# Patient Record
Sex: FEMALE | Race: WHITE | Employment: UNEMPLOYED | URBAN - METROPOLITAN AREA
[De-identification: names, ages, dates, MRNs, and addresses within clinical notes are randomized per-mention and may not be internally consistent; named-entity substitution may affect disease eponyms.]

---

## 2020-08-30 ENCOUNTER — HOSPITAL ENCOUNTER (EMERGENCY)
Age: 60
Discharge: ANOTHER HOSPITAL | End: 2020-08-31
Attending: EMERGENCY MEDICINE
Payer: COMMERCIAL

## 2020-08-30 ENCOUNTER — APPOINTMENT (OUTPATIENT)
Dept: GENERAL RADIOLOGY | Age: 60
End: 2020-08-30
Payer: COMMERCIAL

## 2020-08-30 PROCEDURE — 71045 X-RAY EXAM CHEST 1 VIEW: CPT

## 2020-08-30 PROCEDURE — 99291 CRITICAL CARE FIRST HOUR: CPT

## 2020-08-30 PROCEDURE — 93005 ELECTROCARDIOGRAM TRACING: CPT | Performed by: EMERGENCY MEDICINE

## 2020-08-30 PROCEDURE — 87040 BLOOD CULTURE FOR BACTERIA: CPT

## 2020-08-30 PROCEDURE — 2580000003 HC RX 258: Performed by: EMERGENCY MEDICINE

## 2020-08-30 PROCEDURE — 36415 COLL VENOUS BLD VENIPUNCTURE: CPT

## 2020-08-30 RX ORDER — 0.9 % SODIUM CHLORIDE 0.9 %
30 INTRAVENOUS SOLUTION INTRAVENOUS ONCE
Status: COMPLETED | OUTPATIENT
Start: 2020-08-30 | End: 2020-08-31

## 2020-08-30 RX ORDER — ACETAMINOPHEN 325 MG/1
650 TABLET ORAL ONCE
Status: COMPLETED | OUTPATIENT
Start: 2020-08-30 | End: 2020-08-31

## 2020-08-30 RX ADMIN — SODIUM CHLORIDE 1503 ML: 9 INJECTION, SOLUTION INTRAVENOUS at 23:56

## 2020-08-30 ASSESSMENT — PAIN DESCRIPTION - DIRECTION: RADIATING_TOWARDS: NON RADIATING

## 2020-08-30 ASSESSMENT — PAIN DESCRIPTION - ONSET: ONSET: PROGRESSIVE

## 2020-08-30 ASSESSMENT — PAIN - FUNCTIONAL ASSESSMENT: PAIN_FUNCTIONAL_ASSESSMENT: PREVENTS OR INTERFERES SOME ACTIVE ACTIVITIES AND ADLS

## 2020-08-30 ASSESSMENT — PAIN DESCRIPTION - LOCATION: LOCATION: HEAD

## 2020-08-30 ASSESSMENT — PAIN DESCRIPTION - PROGRESSION: CLINICAL_PROGRESSION: GRADUALLY IMPROVING

## 2020-08-30 ASSESSMENT — PAIN DESCRIPTION - DESCRIPTORS: DESCRIPTORS: ACHING

## 2020-08-30 ASSESSMENT — PAIN DESCRIPTION - ORIENTATION: ORIENTATION: RIGHT;LEFT

## 2020-08-30 ASSESSMENT — PAIN DESCRIPTION - FREQUENCY: FREQUENCY: CONTINUOUS

## 2020-08-30 ASSESSMENT — PAIN SCALES - GENERAL: PAINLEVEL_OUTOF10: 6

## 2020-08-30 ASSESSMENT — PAIN DESCRIPTION - PAIN TYPE: TYPE: ACUTE PAIN

## 2020-08-31 VITALS
BODY MASS INDEX: 30.64 KG/M2 | RESPIRATION RATE: 15 BRPM | HEIGHT: 62 IN | SYSTOLIC BLOOD PRESSURE: 132 MMHG | OXYGEN SATURATION: 96 % | DIASTOLIC BLOOD PRESSURE: 58 MMHG | WEIGHT: 166.5 LBS | TEMPERATURE: 98.5 F | HEART RATE: 86 BPM

## 2020-08-31 LAB
A/G RATIO: 1.2 (ref 1.1–2.2)
ALBUMIN SERPL-MCNC: 3.8 G/DL (ref 3.4–5)
ALP BLD-CCNC: 58 U/L (ref 40–129)
ALT SERPL-CCNC: 8 U/L (ref 10–40)
ANION GAP SERPL CALCULATED.3IONS-SCNC: 11 MMOL/L (ref 3–16)
AST SERPL-CCNC: 13 U/L (ref 15–37)
BASOPHILS ABSOLUTE: 0 K/UL (ref 0–0.2)
BASOPHILS RELATIVE PERCENT: 0.3 %
BILIRUB SERPL-MCNC: 0.9 MG/DL (ref 0–1)
BUN BLDV-MCNC: 13 MG/DL (ref 7–20)
CALCIUM SERPL-MCNC: 9.6 MG/DL (ref 8.3–10.6)
CHLORIDE BLD-SCNC: 103 MMOL/L (ref 99–110)
CO2: 21 MMOL/L (ref 21–32)
CREAT SERPL-MCNC: 1.3 MG/DL (ref 0.6–1.2)
EKG ATRIAL RATE: 128 BPM
EKG DIAGNOSIS: NORMAL
EKG P AXIS: 67 DEGREES
EKG P-R INTERVAL: 144 MS
EKG Q-T INTERVAL: 308 MS
EKG QRS DURATION: 78 MS
EKG QTC CALCULATION (BAZETT): 449 MS
EKG R AXIS: -14 DEGREES
EKG T AXIS: 105 DEGREES
EKG VENTRICULAR RATE: 128 BPM
EOSINOPHILS ABSOLUTE: 0 K/UL (ref 0–0.6)
EOSINOPHILS RELATIVE PERCENT: 0.3 %
GFR AFRICAN AMERICAN: 50
GFR NON-AFRICAN AMERICAN: 42
GLOBULIN: 3.2 G/DL
GLUCOSE BLD-MCNC: 170 MG/DL (ref 70–99)
HCT VFR BLD CALC: 39.8 % (ref 36–48)
HEMOGLOBIN: 13.2 G/DL (ref 12–16)
LACTIC ACID, SEPSIS: 1.1 MMOL/L (ref 0.4–1.9)
LYMPHOCYTES ABSOLUTE: 1 K/UL (ref 1–5.1)
LYMPHOCYTES RELATIVE PERCENT: 8.3 %
MCH RBC QN AUTO: 28.7 PG (ref 26–34)
MCHC RBC AUTO-ENTMCNC: 33.2 G/DL (ref 31–36)
MCV RBC AUTO: 86.6 FL (ref 80–100)
MONOCYTES ABSOLUTE: 0.8 K/UL (ref 0–1.3)
MONOCYTES RELATIVE PERCENT: 6.2 %
NEUTROPHILS ABSOLUTE: 10.8 K/UL (ref 1.7–7.7)
NEUTROPHILS RELATIVE PERCENT: 84.9 %
PDW BLD-RTO: 14.4 % (ref 12.4–15.4)
PLATELET # BLD: 167 K/UL (ref 135–450)
PMV BLD AUTO: 7.3 FL (ref 5–10.5)
POTASSIUM REFLEX MAGNESIUM: 3.6 MMOL/L (ref 3.5–5.1)
PRO-BNP: 892 PG/ML (ref 0–124)
RBC # BLD: 4.59 M/UL (ref 4–5.2)
SODIUM BLD-SCNC: 135 MMOL/L (ref 136–145)
TOTAL PROTEIN: 7 G/DL (ref 6.4–8.2)
TROPONIN: <0.01 NG/ML
WBC # BLD: 12.6 K/UL (ref 4–11)

## 2020-08-31 PROCEDURE — 80053 COMPREHEN METABOLIC PANEL: CPT

## 2020-08-31 PROCEDURE — 83880 ASSAY OF NATRIURETIC PEPTIDE: CPT

## 2020-08-31 PROCEDURE — 84484 ASSAY OF TROPONIN QUANT: CPT

## 2020-08-31 PROCEDURE — 2580000003 HC RX 258: Performed by: EMERGENCY MEDICINE

## 2020-08-31 PROCEDURE — 83605 ASSAY OF LACTIC ACID: CPT

## 2020-08-31 PROCEDURE — 85025 COMPLETE CBC W/AUTO DIFF WBC: CPT

## 2020-08-31 PROCEDURE — 96375 TX/PRO/DX INJ NEW DRUG ADDON: CPT

## 2020-08-31 PROCEDURE — 93010 ELECTROCARDIOGRAM REPORT: CPT | Performed by: INTERNAL MEDICINE

## 2020-08-31 PROCEDURE — U0003 INFECTIOUS AGENT DETECTION BY NUCLEIC ACID (DNA OR RNA); SEVERE ACUTE RESPIRATORY SYNDROME CORONAVIRUS 2 (SARS-COV-2) (CORONAVIRUS DISEASE [COVID-19]), AMPLIFIED PROBE TECHNIQUE, MAKING USE OF HIGH THROUGHPUT TECHNOLOGIES AS DESCRIBED BY CMS-2020-01-R: HCPCS

## 2020-08-31 PROCEDURE — 96367 TX/PROPH/DG ADDL SEQ IV INF: CPT

## 2020-08-31 PROCEDURE — 36415 COLL VENOUS BLD VENIPUNCTURE: CPT

## 2020-08-31 PROCEDURE — 96365 THER/PROPH/DIAG IV INF INIT: CPT

## 2020-08-31 PROCEDURE — 6360000002 HC RX W HCPCS: Performed by: EMERGENCY MEDICINE

## 2020-08-31 PROCEDURE — 6370000000 HC RX 637 (ALT 250 FOR IP): Performed by: EMERGENCY MEDICINE

## 2020-08-31 RX ORDER — DEXAMETHASONE SODIUM PHOSPHATE 4 MG/ML
6 INJECTION, SOLUTION INTRA-ARTICULAR; INTRALESIONAL; INTRAMUSCULAR; INTRAVENOUS; SOFT TISSUE ONCE
Status: COMPLETED | OUTPATIENT
Start: 2020-08-31 | End: 2020-08-31

## 2020-08-31 RX ADMIN — ACETAMINOPHEN 650 MG: 325 TABLET ORAL at 00:04

## 2020-08-31 RX ADMIN — AZITHROMYCIN MONOHYDRATE 500 MG: 500 INJECTION, POWDER, LYOPHILIZED, FOR SOLUTION INTRAVENOUS at 01:21

## 2020-08-31 RX ADMIN — CEFTRIAXONE 1 G: 1 INJECTION, POWDER, FOR SOLUTION INTRAMUSCULAR; INTRAVENOUS at 00:52

## 2020-08-31 RX ADMIN — DEXAMETHASONE SODIUM PHOSPHATE 6 MG: 4 INJECTION, SOLUTION INTRAMUSCULAR; INTRAVENOUS at 00:53

## 2020-08-31 ASSESSMENT — PAIN DESCRIPTION - PAIN TYPE
TYPE: ACUTE PAIN

## 2020-08-31 ASSESSMENT — PAIN SCALES - GENERAL
PAINLEVEL_OUTOF10: 3
PAINLEVEL_OUTOF10: 5
PAINLEVEL_OUTOF10: 5

## 2020-08-31 ASSESSMENT — PAIN DESCRIPTION - LOCATION
LOCATION: HEAD

## 2020-08-31 NOTE — ED NOTES
Patient loaded into squad by transport team and enroute to [facility]. Nursing report called to April RN using SBAR, patient's pain addressed.       Paul Mejia RN  08/31/20 2106

## 2020-08-31 NOTE — ED PROVIDER NOTES
[address] [facility] Emergency Department      Pt Name: Dennie Marshal  MRN: [id number]  [place] 1960  Date of evaluation: 8/30/2020  Provider: Shabbir Corral MD  [address] [place] [address]  Chief Complaint   Patient presents with    Fever     fever tonight to 102, taking robitussin and tylenol    Cough     cough for 3 days    Fatigue     Patient sleeping for 3 days, states drinking lots of water, no other PO intake. HPI  Dennie Marshal is a 60 y.o. female who presents because of fever and cough. She has been feeling ill for about 3 days. She has been too fatigued to do much and has been sleeping a lot. She has some shortness of breath. Appetite is decreased but she has been drinking water. She took a small dose of Tylenol about 2 hours prior to arrival.  She said it was a generic 175 mg tablet. She is also been taking Robitussin. Her son-in-law is ill at present with similar symptoms. He has not gone for any testing. Patient has been feeling too ill to smoke. She does have a history of congestive heart failure and takes diuretics daily which she has continued to take during her illness. She denies any vomiting or diarrhea. She has not had loss of smell. She does report a headache for several days. REVIEW OF SYSTEMS:  Generalized fatigue, no rash, no chest pain per se, some abdominal pain Pertinent positives and negatives as per the HPI. All other review of systems reviewed and negative. Nursing notes reviewed. PAST MEDICAL HISTORY  Past Medical History:   Diagnosis Date    [facility]     Diabetes mellitus ([facility]     Hyperlipidemia     Hypertension     TIA (transient ischemic attack)      SURGICAL HISTORY  Past Surgical History:   Procedure Laterality Date    HYSTERECTOMY       MEDICATIONS:  No current facility-administered medications on file prior to encounter.       Current Outpatient Medications on File Prior to Encounter   Medication Sig Dispense Refill    pantoprazole sodium (PROTONIX) 40 MG PACK packet Take 40 mg by mouth every morning (before breakfast)      albuterol sulfate  (90 BASE) MCG/ACT inhaler Inhale 2 puffs into the lungs every 6 hours as needed for Wheezing      docusate sodium (COLACE) 100 MG capsule Take 100 mg by mouth 2 times daily      Ergocalciferol (VITAMIN D2 PO) Take 1.25 mg by mouth. Bi weekly - MOnday and thursdays      Cholecalciferol (VITAMIN D3) 2000 UNITS CAPS Take  by mouth. Tues, wed, frid, sat and sund.  ferrous sulfate 325 (65 FE) MG tablet Take 325 mg by mouth 2 times daily.  allopurinol (ZYLOPRIM) 100 MG tablet Take 100 mg by mouth daily.  levothyroxine (SYNTHROID) 175 MCG tablet Take 175 mcg by mouth Daily.  fenofibrate 160 MG tablet Take 160 mg by mouth daily.  furosemide (LASIX) 40 MG tablet Take 40 mg by mouth 2 times daily.  venlafaxine (EFFEXOR XR) 75 MG XR capsule Take 75 mg by mouth daily.  aspirin 81 MG tablet Take 81 mg by mouth daily.  lisinopril (PRINIVIL;ZESTRIL) 20 MG tablet Take 20 mg by mouth 2 times daily.  clopidogrel (PLAVIX) 75 MG tablet Take 75 mg by mouth daily.  atenolol (TENORMIN) 50 MG tablet Take 50 mg by mouth daily.  metFORMIN (GLUCOPHAGE) 500 MG tablet Take 500 mg by mouth every evening.  doxepin (SINEQUAN) 10 MG capsule Take 10 mg by mouth nightly.  atorvastatin (LIPITOR) 80 MG tablet Take 80 mg by mouth every evening.  gabapentin (NEURONTIN) 300 MG capsule Take 300 mg by mouth every evening.  potassium chloride (MICRO-K) 10 MEQ CR capsule Take 10 mEq by mouth 2 times daily. ALLERGIES  Codeine  FAMILY HISTORY:  History reviewed. No pertinent family history.   SOCIAL HISTORY:  Social History     Tobacco Use    Smoking status: Current Every Day Smoker     Packs/day: 1.00     Years: 35.00     Pack years: 35.00     Types: Cigarettes    Smokeless tobacco: Never Used   Substance Use Topics    Alcohol use: No    Drug use: No     IMMUNIZATIONS:  Noncontributory    PHYSICAL EXAM  VITAL SIGNS:  BP (!) 165/63   Pulse 133   Temp 103.2 °F (39.6 °C) (Oral)   Resp 23   Ht 5' 2\" (1.575 m)   Wt 166 lb 8 oz (75.5 kg)   SpO2 94%   BMI 30.45 kg/m²   Constitutional:  60 y.o. female alert, cooperative, appears fatigued  HENT:  Atraumatic, mucous membranes moist  Eyes:   Conjunctiva clear, no icterus  Neck:  Supple, no meningeal signs, no adenopathy  Cardiovascular:  Regular, no rubs, no discernible murmur, tachycardic  Thorax & Lungs:  No accessory muscle usage, left basilar rales  Abdomen:  Soft, non distended, bowel sounds present, no tenderness  Back:  No deformity, no CVA tenderness  Genitalia:  Deferred  Rectal:  Deferred  Extremities:  No cyanosis, no edema  Skin:  Warm, dry, no rash of trunk or extremities  Neurologic:  Alert, no slurred speech, no focal deficits noted  Psychiatric:  Affect appropriate    DIAGNOSTIC RESULTS:  Labs resulted at the time of this note reviewed.   Labs Reviewed   CBC WITH AUTO DIFFERENTIAL - Abnormal; Notable for the following components:       Result Value    WBC 12.6 (*)     Neutrophils Absolute 10.8 (*)     All other components within normal limits    Narrative:     Performed at:  [address] [place] [address]  [address] [facility]   Phone [phone]   COMPREHENSIVE METABOLIC PANEL W/ REFLEX TO MG FOR LOW K - Abnormal; Notable for the following components:    Sodium 135 (*)     Glucose 170 (*)     CREATININE 1.3 (*)     GFR Non- 42 (*)     GFR  50 (*)     ALT 8 (*)     AST 13 (*)     All other components within normal limits    Narrative:     Performed at:  [address] [place] [address]  [address] [facility]   Phone [phone]   BRAIN NATRIURETIC PEPTIDE - Abnormal; Notable for the following components:    Pro- (*)     All other components within normal limits Narrative:     Performed at:  General acute hospital Laboratory  [address] [facility]   Phone [phone]   CULTURE, BLOOD 2   CULTURE, BLOOD 1   TROPONIN    Narrative:     Performed at:  [facility]  [address] [facility]   Phone [phone]   LACTATE, SEPSIS    Narrative:     Performed at:  General acute hospital Laboratory  [address] [facility]   Phone [phone]   LACTATE, SEPSIS   COVID-19   COVID-19   COVID-19   COVID-19     RADIOLOGY:    Plain x-rays were viewed by me:   XR CHEST PORTABLE   Final Result   Right infrahilar opacity, concerning for pneumonia.            EKG:  Read by me in the absence of a cardiologist shows: Sinus tachycardia, rate 128, intervals normal, axis -14 degrees, poor R wave progression, nonspecific ST-T wave abnormality including inversion of lateral leads in leads I and aVL, prior EKG not available    ED COURSE:    Medications administered:  Medications   cefTRIAXone (ROCEPHIN) 1 g IVPB in 50 mL D5W minibag (1 g Intravenous New Bag 8/31/20 0052)   azithromycin (ZITHROMAX) 500 mg in D5W 250ml addavial (has no administration in time range)   0.9 % sodium chloride bolus (1,503 mLs Intravenous New Bag 8/30/20 2356)   acetaminophen (TYLENOL) tablet 650 mg (650 mg Oral Given 8/31/20 0004)   dexamethasone (DECADRON) injection 6 mg (6 mg Intravenous Given 8/31/20 0053)     SEP-1 CORE MEASURE DATA  Classification: severe sepsis  Amount of fluids ordered: at least 30mL/kg based on ideal body weight due to obesity defined as BMI >30 (patient's BMI is Body mass index is 30.45 kg/m².)  Time at which sepsis was identified: 0100  Broad-spectrum antibiotics chosen: rocephin, zithromax based on sepsis order-set for a suspected source of: Pulmonary - Community Acquired  Repeat lactate level: not indicated  On reassessment after treatment:  Updated vital signs:

## 2020-08-31 NOTE — ED NOTES
Patient up to bathroom, ambulating with steady gait. Patient returned to bed, sinus rhythm on cardiac monitor. Denies further needs at this time. Side rails up for patient safety and call light near.       Deb Romo RN  08/31/20 5024

## 2020-08-31 NOTE — ED TRIAGE NOTES
Patient presents as walk in patient with c/o cough and fatigue, sleeping for 3 days. States tonight her daughter took her temperature and it was 102. Patient also reports headache and chills/sweating. States she has been taking robitussin and acetaminophen at home. States she has not eaten in three days, but has been drinking water. Patient also reports smoking 1 - 1/12 ppd of cigarettes, but has not been able to smoke much the past 3 days. Reports that she lives with her daughter and son-in-law. States her son-in-law had similar symptoms that started 2 days prior to her symptoms beginning. She is awake, alert, oriented, resps easy and regular at present. Placed on cardiac monitor and is sinus tachycardia. Skin w/d, MMM & pink, cap refill brisk. Side rails up on bed for patient safety and call light near. Dr. Hellen Loyola in room to examine patient. Patient placed in droplet plus precautions due to symptoms.

## 2020-08-31 NOTE — ED NOTES
transport arrived to transport patient to [facility]. Report given to transport team. Patient awakened to name, alert and oriented, resps easy, regular, states some shortness of breath. Skin w/d, cap refill brisk. Patient states headache has lessened and is now a 3/10 on VAS. O2 intact per NC at 2 LPM, patient sinus rhythm on cardiac monitor. IVs intact in right and left AC. Patient ambulatory with steady gait to transport [facility].       Chani Vaughan RN  08/31/20 3825

## 2020-09-01 LAB — SARS-COV-2, NAA: NOT DETECTED

## 2020-09-04 LAB
BLOOD CULTURE, ROUTINE: NORMAL
CULTURE, BLOOD 2: NORMAL

## 2022-02-28 ENCOUNTER — APPOINTMENT (OUTPATIENT)
Dept: GENERAL RADIOLOGY | Age: 62
End: 2022-02-28
Payer: COMMERCIAL

## 2022-02-28 ENCOUNTER — HOSPITAL ENCOUNTER (EMERGENCY)
Age: 62
Discharge: HOME | End: 2022-02-28
Attending: EMERGENCY MEDICINE
Payer: COMMERCIAL

## 2022-02-28 VITALS
SYSTOLIC BLOOD PRESSURE: 164 MMHG | OXYGEN SATURATION: 95 % | HEART RATE: 99 BPM | HEIGHT: 62 IN | WEIGHT: 156.31 LBS | BODY MASS INDEX: 28.76 KG/M2 | TEMPERATURE: 98.9 F | DIASTOLIC BLOOD PRESSURE: 73 MMHG | RESPIRATION RATE: 18 BRPM

## 2022-02-28 DIAGNOSIS — S63.501A SPRAIN OF RIGHT WRIST, INITIAL ENCOUNTER: Primary | ICD-10-CM

## 2022-02-28 PROCEDURE — 99283 EMERGENCY DEPT VISIT LOW MDM: CPT

## 2022-02-28 PROCEDURE — 73110 X-RAY EXAM OF WRIST: CPT

## 2022-02-28 RX ORDER — IBUPROFEN 400 MG/1
400 TABLET ORAL EVERY 6 HOURS PRN
Qty: 30 TABLET | Refills: 0 | Status: SHIPPED | OUTPATIENT
Start: 2022-02-28

## 2022-02-28 ASSESSMENT — PAIN DESCRIPTION - PAIN TYPE: TYPE: OTHER (COMMENT)

## 2022-02-28 ASSESSMENT — PAIN SCALES - GENERAL: PAINLEVEL_OUTOF10: 8

## 2022-02-28 ASSESSMENT — PAIN DESCRIPTION - DESCRIPTORS: DESCRIPTORS: DISCOMFORT

## 2022-02-28 ASSESSMENT — PAIN DESCRIPTION - LOCATION: LOCATION: WRIST

## 2022-02-28 ASSESSMENT — PAIN - FUNCTIONAL ASSESSMENT: PAIN_FUNCTIONAL_ASSESSMENT: 0-10

## 2022-02-28 ASSESSMENT — PAIN DESCRIPTION - ORIENTATION: ORIENTATION: RIGHT

## 2022-03-01 NOTE — ED PROVIDER NOTES
CHIEF COMPLAINT  Chief Complaint   Patient presents with    Wrist Injury     reports tripping over a toy 3 days ago and injured right wrist/ no loc or head injury reported       [address] [facility]  Saritha Wooten is a 57 y.o. female who presents to the ED complaining of right wrist pain after trying to hold herself up in the door frame when she thought she was falling 3 days ago. Patient complains of pain primarily over the lateral wrist over the thumb extensor tendons. No paresthesias. No hand or elbow pain. No other complaints, modifying factors or associated symptoms. Nursing notes reviewed. Past Medical History:   Diagnosis Date    CAD (coronary artery disease)     [facility]     COPD [facility]ch[facility] disease) [facility]     Diabetes mellitus ([facility]     Hyperlipidemia     Hypertension     Thyroid disease     TIA (transient ischemic attack)      Past Surgical History:   Procedure Laterality Date    HYSTERECTOMY       History reviewed. No pertinent family history.   Social History     Socioeconomic History    Marital status:      Spouse name: Not on file    Number of children: Not on file    Years of education: Not on file    Highest education level: Not on file   Occupational History    Not on file   Tobacco Use    Smoking status: Current Every Day Smoker     Packs/day: 1.00     Years: 35.00     Pack years: 35.00     Types: Cigarettes    Smokeless tobacco: Never Used   Vaping Use    Vaping Use: Never used   Substance and Sexual Activity    Alcohol use: No    Drug use: No    Sexual activity: Never   Other Topics Concern    Not on file   Social History Narrative    Not on file     Social Determinants of Health     Financial Resource Strain:     Difficulty of Paying Living Expenses: Not on file   Food Insecurity:     Worried About Running Out of Food in the Last Year: Not on file    Giuliano of Food in the Last Year: Not on file   Transportation Needs:     Lack of Transportation (Medical): Not on file    Lack of Transportation (Non-Medical): Not on file   Physical Activity:     Days of Exercise per Week: Not on file    Minutes of Exercise per Session: Not on file   Stress:     Feeling of Stress : Not on file   Social Connections:     Frequency of Communication with Friends and Family: Not on file    Frequency of Social Gatherings with Friends and Family: Not on file    Attends Jainism Services: Not on file    Active Member of [address] [place] [address] [employer] or Organizations: Not on file    Attends Club or Organization Meetings: Not on file    Marital Status: Not on file   Intimate Partner Violence:     Fear of Current or Ex-Partner: Not on file    Emotionally Abused: Not on file    Physically Abused: Not on file    Sexually Abused: Not on file   Housing Stability:     Unable to Pay for Housing in the Last Year: Not on file    Number of [place] in the Last Year: Not on file    Unstable Housing in the Last Year: Not on file     No current facility-administered medications for this encounter. Current Outpatient Medications   Medication Sig Dispense Refill    ibuprofen (IBU) 400 MG tablet Take 1 tablet by mouth every 6 hours as needed for Pain 30 tablet 0    pantoprazole sodium (PROTONIX) 40 MG PACK packet Take 40 mg by mouth every morning (before breakfast)      albuterol sulfate  (90 BASE) MCG/ACT inhaler Inhale 2 puffs into the lungs every 6 hours as needed for Wheezing      docusate sodium (COLACE) 100 MG capsule Take 100 mg by mouth 2 times daily      Ergocalciferol (VITAMIN D2 PO) Take 1.25 mg by mouth. Bi weekly - MOnday and thursdays      Cholecalciferol (VITAMIN D3) 2000 UNITS CAPS Take  by mouth. Tues, wed, frid, sat and sund.  ferrous sulfate 325 (65 FE) MG tablet Take 325 mg by mouth 2 times daily.  levothyroxine (SYNTHROID) 175 MCG tablet Take 175 mcg by mouth Daily.       potassium chloride (MICRO-K) 10 MEQ CR capsule Take 10 mEq by mouth 2 times daily.  fenofibrate 160 MG tablet Take 160 mg by mouth daily.  furosemide (LASIX) 40 MG tablet Take 40 mg by mouth 2 times daily.  venlafaxine (EFFEXOR XR) 75 MG XR capsule Take 75 mg by mouth daily.  aspirin 81 MG tablet Take 81 mg by mouth daily.  lisinopril (PRINIVIL;ZESTRIL) 20 MG tablet Take 20 mg by mouth 2 times daily.  clopidogrel (PLAVIX) 75 MG tablet Take 75 mg by mouth daily.  atenolol (TENORMIN) 50 MG tablet Take 50 mg by mouth daily.  metFORMIN (GLUCOPHAGE) 500 MG tablet Take 500 mg by mouth every evening.  doxepin (SINEQUAN) 10 MG capsule Take 10 mg by mouth nightly.  atorvastatin (LIPITOR) 80 MG tablet Take 80 mg by mouth every evening.  gabapentin (NEURONTIN) 300 MG capsule Take 300 mg by mouth every evening.  allopurinol (ZYLOPRIM) 100 MG tablet Take 100 mg by mouth daily. Allergies   Allergen Reactions    Bupropion      FLIPPED OUT    Codeine Nausea Only       REVIEW OF SYSTEMS  Positives and pertinent negatives as per HPI. Six other systems were reviewed and are negative. Nursing notes pertaining to ROS were reviewed. PHYSICAL EXAM   BP (!) 164/73   Pulse 99   Temp 98.9 °F (37.2 °C) (Tympanic)   Resp 18   Ht 5' 2\" (1.575 m)   Wt 156 lb 4.9 oz (70.9 kg)   SpO2 95%   BMI 28.59 kg/m²   GENERAL APPEARANCE: Awake and alert. Cooperative. No acute distress. HEAD: Normocephalic. Atraumatic. EYES: PERRL. EOM's grossly intact. No scleral icterus, injection or exudate  ENT: Mucous membranes are moist.    EXTREMITIES: MAEE. No acute deformities. No tenderness to palpation of the left shoulder, elbow, proximal forearm, snuffbox, hand or fingers. Patient has point tenderness along the thumb extensor tendons and a positive Finkelstein's test.  No deformity or edema. SKIN: Warm and dry. NEUROLOGICAL: Alert and oriented.      RADIOLOGY    XR WRIST RIGHT (MIN 3 VIEWS)   Final Result   No acute abnormality detected               ED COURSE/MDM  Left wrist pain: Pain primarily of the left thumb extensor tendons patient has normal opposition and no evidence of tendon rupture. No evidence of acute fracture or dislocation. RICE, ibuprofen, Ace wrap, orthopedic follow-up in 2-week if symptoms are not improving. Hypertension:  Patient was hypertensive during the ER visit today. There are no signs of hypertensive emergency or evidence of end organ damage by history or physical exam.  These findings were discussed with the patient and advised to follow up with primary care physician to further assess and treat hypertension in the outpatient setting. The patient's blood pressure was found to be elevated according to CMS/Medicare and the Affordable Care Act/ObamaCare criteria. Elevated blood pressure could occur because of pain or anxiety or other reasons and does not mean that they need to have their blood pressure treated or medications otherwise adjusted. However, this could also be a sign that they will need to have their blood pressure treated or medications changed. The patient was instructed to take a list of recent blood pressure readings to their next visit with their personal physician. Patient was given scripts for the following medications. I counseled patient how to take these medications. New Prescriptions    IBUPROFEN (IBU) 400 MG TABLET    Take 1 tablet by mouth every 6 hours as needed for Pain         CLINICAL IMPRESSION  1. Sprain of right wrist, initial encounter        Blood pressure (!) 164/73, pulse 99, temperature 98.9 °F (37.2 °C), temperature source Tympanic, resp. rate 18, height 5' 2\" (1.575 m), weight 156 lb 4.9 oz (70.9 kg), SpO2 95 %.       Follow-up with:  Rafal Medley MD  [address] [place] [address] [phone]    In 2 weeks  If symptoms worsen          Nahum Hanna MD  02/28/22 8866

## 2022-03-01 NOTE — ED NOTES
Ace wrap applied to right wrist per order. Reviewed discharge instructions and f/u care.       Elli Dominguez, SEBAS  02/28/22 6124

## 2024-08-15 ENCOUNTER — APPOINTMENT (OUTPATIENT)
Dept: GENERAL RADIOLOGY | Age: 64
End: 2024-08-15
Payer: COMMERCIAL

## 2024-08-15 ENCOUNTER — HOSPITAL ENCOUNTER (EMERGENCY)
Age: 64
Discharge: HOME | End: 2024-08-16
Attending: EMERGENCY MEDICINE
Payer: COMMERCIAL

## 2024-08-15 DIAGNOSIS — S92.501A CLOSED FRACTURE OF PHALANX OF RIGHT FIFTH TOE, INITIAL ENCOUNTER: Primary | ICD-10-CM

## 2024-08-15 DIAGNOSIS — S83.91XA SPRAIN OF RIGHT KNEE, UNSPECIFIED LIGAMENT, INITIAL ENCOUNTER: ICD-10-CM

## 2024-08-15 PROCEDURE — 73560 X-RAY EXAM OF KNEE 1 OR 2: CPT

## 2024-08-15 PROCEDURE — 96372 THER/PROPH/DIAG INJ SC/IM: CPT

## 2024-08-15 PROCEDURE — 99284 EMERGENCY DEPT VISIT MOD MDM: CPT

## 2024-08-15 PROCEDURE — 73630 X-RAY EXAM OF FOOT: CPT

## 2024-08-15 PROCEDURE — 93005 ELECTROCARDIOGRAM TRACING: CPT | Performed by: EMERGENCY MEDICINE

## 2024-08-15 PROCEDURE — 6370000000 HC RX 637 (ALT 250 FOR IP): Performed by: EMERGENCY MEDICINE

## 2024-08-15 PROCEDURE — 6360000002 HC RX W HCPCS: Performed by: EMERGENCY MEDICINE

## 2024-08-15 RX ORDER — PROMETHAZINE HYDROCHLORIDE 25 MG/1
25 TABLET ORAL EVERY 6 HOURS PRN
COMMUNITY

## 2024-08-15 RX ORDER — ISOSORBIDE MONONITRATE 60 MG/1
60 TABLET, EXTENDED RELEASE ORAL DAILY
COMMUNITY

## 2024-08-15 RX ORDER — AMLODIPINE BESYLATE 10 MG/1
10 TABLET ORAL DAILY
COMMUNITY

## 2024-08-15 RX ORDER — AMITRIPTYLINE HYDROCHLORIDE 10 MG/1
10 TABLET, FILM COATED ORAL NIGHTLY
COMMUNITY

## 2024-08-15 RX ORDER — MORPHINE SULFATE 10 MG/ML
10 INJECTION INTRAVENOUS ONCE
Status: COMPLETED | OUTPATIENT
Start: 2024-08-15 | End: 2024-08-15

## 2024-08-15 RX ORDER — METOPROLOL TARTRATE 50 MG/1
50 TABLET, FILM COATED ORAL 2 TIMES DAILY
COMMUNITY

## 2024-08-15 RX ORDER — METOPROLOL TARTRATE 50 MG/1
50 TABLET, FILM COATED ORAL ONCE
Status: COMPLETED | OUTPATIENT
Start: 2024-08-15 | End: 2024-08-15

## 2024-08-15 RX ADMIN — METOPROLOL TARTRATE 50 MG: 50 TABLET, FILM COATED ORAL at 23:43

## 2024-08-15 RX ADMIN — MORPHINE SULFATE 10 MG: 10 INJECTION INTRAVENOUS at 23:42

## 2024-08-15 ASSESSMENT — PAIN DESCRIPTION - LOCATION: LOCATION: FOOT;KNEE

## 2024-08-15 ASSESSMENT — PAIN DESCRIPTION - ORIENTATION: ORIENTATION: RIGHT

## 2024-08-15 ASSESSMENT — PAIN - FUNCTIONAL ASSESSMENT: PAIN_FUNCTIONAL_ASSESSMENT: 0-10

## 2024-08-15 ASSESSMENT — PAIN SCALES - GENERAL: PAINLEVEL_OUTOF10: 8

## 2024-08-16 VITALS
WEIGHT: 150 LBS | TEMPERATURE: 98.7 F | SYSTOLIC BLOOD PRESSURE: 175 MMHG | BODY MASS INDEX: 28.32 KG/M2 | DIASTOLIC BLOOD PRESSURE: 90 MMHG | OXYGEN SATURATION: 93 % | HEART RATE: 108 BPM | RESPIRATION RATE: 22 BRPM | HEIGHT: 61 IN

## 2024-08-16 LAB
EKG ATRIAL RATE: 112 BPM
EKG DIAGNOSIS: NORMAL
EKG P AXIS: 65 DEGREES
EKG P-R INTERVAL: 156 MS
EKG Q-T INTERVAL: 340 MS
EKG QRS DURATION: 76 MS
EKG QTC CALCULATION (BAZETT): 464 MS
EKG R AXIS: -34 DEGREES
EKG T AXIS: 127 DEGREES
EKG VENTRICULAR RATE: 112 BPM

## 2024-08-16 PROCEDURE — 93010 ELECTROCARDIOGRAM REPORT: CPT | Performed by: INTERNAL MEDICINE

## 2024-08-16 RX ORDER — HYDROCODONE BITARTRATE AND ACETAMINOPHEN 5; 325 MG/1; MG/1
1 TABLET ORAL EVERY 8 HOURS PRN
Qty: 12 TABLET | Refills: 0 | Status: SHIPPED | OUTPATIENT
Start: 2024-08-16 | End: 2024-08-20

## 2024-08-16 ASSESSMENT — PAIN SCALES - GENERAL: PAINLEVEL_OUTOF10: 5

## 2024-08-16 NOTE — ED PROVIDER NOTES
Emergency Department Provider Note  Location: [facility]  8/15/2024     Patient Identification  Renée Tineo is a 63 y.o. female    Chief Complaint  Fall (Pt with a cardiac hx and on blood thinners. Pt state that she has falling twice in the last month)      Mode of Arrival  private car    HPI  (History provided by patient)  This is a 63 y.o. female with a PMH significant for COPD, CAD, HTN, sciatica presented today for fall.  Patient states she has chronic pain in the lower back that radiates down her right leg.  As a result, she is somewhat limited in how high she can lift her right leg.  Today, she was walking in her kitchen and needed to step over a basket.  Patient states she could not get her right leg high enough and ended up tripping over the basket.  She landed on her right foot and knee.  She denies hitting her head.  Her primary complaint is right foot pain and right knee pain.  Pain is worse when she attempts to bear weight.  She rates her pain 10/10 intensity.  She denies injury anywhere else.  She specifically denies pain in the upper extremity or neck area.  No new back pain other than her chronic back pain.    No other complaints, modifying factors or associated symptoms.    ROS  No LOC  No AMS  No neck pain    I have reviewed the following nursing documentation:  Allergies:   Allergies   Allergen Reactions    Bupropion      FLIPPED OUT    Codeine Nausea Only       Past medical history:  has a past medical history of CAD (coronary artery disease), CHF (congestive heart failure) ([facility]), COPD (chronic obstructive pulmonary disease) ([facility]), Diabetes mellitus (HCC), Hyperlipidemia, Hypertension, Thyroid disease, and TIA (transient ischemic attack).    Past surgical history:  has a past surgical history that includes Hysterectomy.    Home medications:   Prior to Admission medications    Medication Sig Start Date End Date Taking? Authorizing Provider  reviewed.        - I discussed the results with patient.  We agreed to dynamic splinting of the 5th toe to the 4th toe and provide post-op shoe. We will also provide ace wrap for the right knee for comfort. Plan: R.I.C.E. outpatient f/u with PCP.   -Incidentally found as elevated blood pressure and tachycardia on arrival.  EKG obtained to confirm sinus tachycardia and no underlying malignant rhythm.  Patient says she was overdue for her metoprolol.  This could account for the tachycardia and hypertension.  Patient also received morphine for pain.  When I reassessed the patient, she say her pain was a lot better.  Blood pressure also improved significantly.  Tachycardia starting to resolve after she received her metoprolol.  - Return precautions also discussed.  Patient verbalized understanding of care plan and agreed to follow-up with PCP as advised.        - Is this patient to be included in the SEP-1 Core Measure due to severe sepsis or septic shock?   No   Exclusion criteria - the patient is NOT to be included for SEP-1 Core Measure due to:  Infection is not suspected    I estimate there is LOW risk for ACUTE FRACTURE OR DISLOCATION, COMPARTMENT SYNDROME, DEEP VENOUS THROMBOSIS, SEPTIC ARTHRITIS, TENDON OR NEUROVASCULAR INJURY, thus I consider the discharge disposition reasonable. Renée Tineo and I have discussed the diagnosis and risks, and we agree with discharging home to follow-up with PCP. We also discussed returning to the Emergency Department immediately if new or worsening symptoms occur. We have discussed the symptoms which are most concerning (e.g., changing or worsening pain, numbness, weakness) that necessitate immediate return.     Clinical Impression:  1. Closed fracture of phalanx of right fifth toe, initial encounter    2. Sprain of right knee, unspecified ligament, initial encounter          Disposition:  Discharge to home in good condition.    Blood pressure (!) 175/90, pulse (!) 108,